# Patient Record
Sex: MALE | Race: WHITE | NOT HISPANIC OR LATINO | Employment: UNEMPLOYED | URBAN - METROPOLITAN AREA
[De-identification: names, ages, dates, MRNs, and addresses within clinical notes are randomized per-mention and may not be internally consistent; named-entity substitution may affect disease eponyms.]

---

## 2022-05-04 ENCOUNTER — HOSPITAL ENCOUNTER (EMERGENCY)
Facility: HOSPITAL | Age: 32
Discharge: HOME | End: 2022-05-04
Attending: EMERGENCY MEDICINE

## 2022-05-04 VITALS
DIASTOLIC BLOOD PRESSURE: 61 MMHG | HEART RATE: 48 BPM | RESPIRATION RATE: 16 BRPM | WEIGHT: 150.13 LBS | OXYGEN SATURATION: 100 % | TEMPERATURE: 96.1 F | SYSTOLIC BLOOD PRESSURE: 113 MMHG

## 2022-05-04 DIAGNOSIS — T40.1X1A HEROIN OVERDOSE (HCC): Primary | ICD-10-CM

## 2022-05-04 PROCEDURE — 99282 EMERGENCY DEPT VISIT SF MDM: CPT | Performed by: EMERGENCY MEDICINE

## 2022-05-04 PROCEDURE — 99284 EMERGENCY DEPT VISIT MOD MDM: CPT

## 2022-05-04 NOTE — Clinical Note
Rachel Johnathanhusam was seen and treated in our emergency department on 5/4/2022  Diagnosis:     Christian Brandee    He may return on this date: 05/07/2022         If you have any questions or concerns, please don't hesitate to call        Pamela Allen DO    ______________________________           _______________          _______________  Hospital Representative                              Date                                Time
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
## 2022-05-04 NOTE — ED NOTES
Walking in room with steady gait; given phone to call, and sandwich       Veronica Victoria RN  05/04/22 8781

## 2022-08-27 ENCOUNTER — APPOINTMENT (EMERGENCY)
Dept: CT IMAGING | Facility: HOSPITAL | Age: 32
End: 2022-08-27
Payer: COMMERCIAL

## 2022-08-27 ENCOUNTER — APPOINTMENT (OUTPATIENT)
Dept: RADIOLOGY | Facility: HOSPITAL | Age: 32
End: 2022-08-27
Payer: COMMERCIAL

## 2022-08-27 ENCOUNTER — HOSPITAL ENCOUNTER (EMERGENCY)
Facility: HOSPITAL | Age: 32
Discharge: HOME | End: 2022-08-28
Attending: EMERGENCY MEDICINE
Payer: COMMERCIAL

## 2022-08-27 VITALS
RESPIRATION RATE: 20 BRPM | OXYGEN SATURATION: 99 % | TEMPERATURE: 98.4 F | SYSTOLIC BLOOD PRESSURE: 137 MMHG | WEIGHT: 150.79 LBS | DIASTOLIC BLOOD PRESSURE: 72 MMHG | HEART RATE: 83 BPM

## 2022-08-27 DIAGNOSIS — S01.01XA SCALP LACERATION, INITIAL ENCOUNTER: ICD-10-CM
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
## 2022-08-27 DIAGNOSIS — S05.00XA CORNEAL ABRASION: ICD-10-CM

## 2022-08-27 PROCEDURE — 71045 X-RAY EXAM CHEST 1 VIEW: CPT

## 2022-08-27 PROCEDURE — G1004 CDSM NDSC: HCPCS

## 2022-08-27 PROCEDURE — 70450 CT HEAD/BRAIN W/O DYE: CPT

## 2022-08-27 PROCEDURE — 99284 EMERGENCY DEPT VISIT MOD MDM: CPT

## 2022-08-27 RX ORDER — LIDOCAINE HYDROCHLORIDE AND EPINEPHRINE 10; 10 MG/ML; UG/ML
5 INJECTION, SOLUTION INFILTRATION; PERINEURAL ONCE
Status: COMPLETED | OUTPATIENT
Start: 2022-08-27 | End: 2022-08-27

## 2022-08-27 RX ORDER — ERYTHROMYCIN 5 MG/G
OINTMENT OPHTHALMIC
Qty: 3.5 G | Refills: 0 | Status: SHIPPED | OUTPATIENT
Start: 2022-08-27

## 2022-08-27 RX ORDER — GINSENG 100 MG
1 CAPSULE ORAL ONCE
Status: COMPLETED | OUTPATIENT
Start: 2022-08-27 | End: 2022-08-27

## 2022-08-27 RX ORDER — TETRACAINE HYDROCHLORIDE 5 MG/ML
1 SOLUTION OPHTHALMIC ONCE
Status: COMPLETED | OUTPATIENT
Start: 2022-08-27 | End: 2022-08-27

## 2022-08-27 RX ORDER — ERYTHROMYCIN 5 MG/G
0.5 OINTMENT OPHTHALMIC ONCE
Status: COMPLETED | OUTPATIENT
Start: 2022-08-27 | End: 2022-08-27

## 2022-08-27 RX ADMIN — BACITRACIN ZINC 1 SMALL APPLICATION: 500 OINTMENT TOPICAL at 21:16

## 2022-08-27 RX ADMIN — ERYTHROMYCIN 0.5 INCH: 5 OINTMENT OPHTHALMIC at 21:17

## 2022-08-27 RX ADMIN — FLUORESCEIN SODIUM 1 STRIP: 1 STRIP OPHTHALMIC at 20:06

## 2022-08-27 RX ADMIN — LIDOCAINE HYDROCHLORIDE AND EPINEPHRINE 5 ML: 10; 10 INJECTION, SOLUTION INFILTRATION; PERINEURAL at 22:55

## 2022-08-27 RX ADMIN — TETRACAINE HYDROCHLORIDE 1 DROP: 5 SOLUTION OPHTHALMIC at 19:16

## 2022-08-27 RX ADMIN — FLUORESCEIN SODIUM 1 STRIP: 1 STRIP OPHTHALMIC at 21:45

## 2022-08-28 PROCEDURE — 12002 RPR S/N/AX/GEN/TRNK2.6-7.5CM: CPT | Performed by: EMERGENCY MEDICINE

## 2022-08-28 PROCEDURE — 99284 EMERGENCY DEPT VISIT MOD MDM: CPT | Performed by: EMERGENCY MEDICINE
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
## 2024-03-09 ENCOUNTER — APPOINTMENT (EMERGENCY)
Dept: RADIOLOGY | Facility: HOSPITAL | Age: 34
End: 2024-03-09

## 2024-03-09 ENCOUNTER — HOSPITAL ENCOUNTER (EMERGENCY)
Facility: HOSPITAL | Age: 34
Discharge: HOME | End: 2024-03-09
Attending: EMERGENCY MEDICINE

## 2024-03-09 VITALS
SYSTOLIC BLOOD PRESSURE: 162 MMHG | HEART RATE: 62 BPM | TEMPERATURE: 97.5 F | DIASTOLIC BLOOD PRESSURE: 78 MMHG | OXYGEN SATURATION: 100 % | RESPIRATION RATE: 20 BRPM

## 2024-03-09 DIAGNOSIS — F19.10 SUBSTANCE ABUSE (HCC): Primary | ICD-10-CM

## 2024-03-09 DIAGNOSIS — G92.9 TOXIC ENCEPHALOPATHY: ICD-10-CM

## 2024-03-09 LAB
ALBUMIN SERPL BCP-MCNC: 4.5 G/DL (ref 3.5–5)
ALP SERPL-CCNC: 55 U/L (ref 34–104)
ALT SERPL W P-5'-P-CCNC: 16 U/L (ref 7–52)
ANION GAP SERPL CALCULATED.3IONS-SCNC: 12 MMOL/L
APAP SERPL-MCNC: <2 UG/ML (ref 10–20)
AST SERPL W P-5'-P-CCNC: 24 U/L (ref 13–39)
ATRIAL RATE: 71 BPM
BASE EX.OXY STD BLDV CALC-SCNC: 95.1 % (ref 60–80)
BASE EXCESS BLDV CALC-SCNC: -2.8 MMOL/L
BASOPHILS # BLD AUTO: 0.04 THOUSANDS/ÂΜL (ref 0–0.1)
BASOPHILS NFR BLD AUTO: 0 % (ref 0–1)
BILIRUB SERPL-MCNC: 0.68 MG/DL (ref 0.2–1)
BUN SERPL-MCNC: 16 MG/DL (ref 5–25)
CALCIUM SERPL-MCNC: 9.5 MG/DL (ref 8.4–10.2)
CHLORIDE SERPL-SCNC: 104 MMOL/L (ref 96–108)
CK SERPL-CCNC: 567 U/L (ref 39–308)
CO2 SERPL-SCNC: 24 MMOL/L (ref 21–32)
CREAT SERPL-MCNC: 1.15 MG/DL (ref 0.6–1.3)
EOSINOPHIL # BLD AUTO: 0.03 THOUSAND/ÂΜL (ref 0–0.61)
EOSINOPHIL NFR BLD AUTO: 0 % (ref 0–6)
ERYTHROCYTE [DISTWIDTH] IN BLOOD BY AUTOMATED COUNT: 13.7 % (ref 11.6–15.1)
ETHANOL SERPL-MCNC: <10 MG/DL
GFR SERPL CREATININE-BSD FRML MDRD: 81 ML/MIN/1.73SQ M
GLUCOSE SERPL-MCNC: 83 MG/DL (ref 65–140)
HCO3 BLDV-SCNC: 22.5 MMOL/L (ref 24–30)
HCT VFR BLD AUTO: 33.2 % (ref 36.5–49.3)
HGB BLD-MCNC: 12.2 G/DL (ref 12–17)
IMM GRANULOCYTES # BLD AUTO: 0.05 THOUSAND/UL (ref 0–0.2)
IMM GRANULOCYTES NFR BLD AUTO: 0 % (ref 0–2)
LYMPHOCYTES # BLD AUTO: 1.31 THOUSANDS/ÂΜL (ref 0.6–4.47)
LYMPHOCYTES NFR BLD AUTO: 12 % (ref 14–44)
MCH RBC QN AUTO: 28.7 PG (ref 26.8–34.3)
MCHC RBC AUTO-ENTMCNC: 36.7 G/DL (ref 31.4–37.4)
MCV RBC AUTO: 78 FL (ref 82–98)
MONOCYTES # BLD AUTO: 0.7 THOUSAND/ÂΜL (ref 0.17–1.22)
MONOCYTES NFR BLD AUTO: 6 % (ref 4–12)
NEUTROPHILS # BLD AUTO: 9.12 THOUSANDS/ÂΜL (ref 1.85–7.62)
NEUTS SEG NFR BLD AUTO: 82 % (ref 43–75)
NRBC BLD AUTO-RTO: 0 /100 WBCS
O2 CT BLDV-SCNC: 17.2 ML/DL
P AXIS: 78 DEGREES
PCO2 BLDV: 41.2 MM HG (ref 42–50)
PH BLDV: 7.36 [PH] (ref 7.3–7.4)
PLATELET # BLD AUTO: 293 THOUSANDS/UL (ref 149–390)
PMV BLD AUTO: 11.5 FL (ref 8.9–12.7)
PO2 BLDV: 144.1 MM HG (ref 35–45)
POTASSIUM SERPL-SCNC: 3.9 MMOL/L (ref 3.5–5.3)
PR INTERVAL: 154 MS
PROT SERPL-MCNC: 6.8 G/DL (ref 6.4–8.4)
QRS AXIS: 50 DEGREES
QRSD INTERVAL: 96 MS
QT INTERVAL: 498 MS
QTC INTERVAL: 541 MS
RBC # BLD AUTO: 4.25 MILLION/UL (ref 3.88–5.62)
SALICYLATES SERPL-MCNC: <5 MG/DL (ref 3–20)
SODIUM SERPL-SCNC: 140 MMOL/L (ref 135–147)
T WAVE AXIS: 68 DEGREES
VENTRICULAR RATE: 71 BPM
WBC # BLD AUTO: 11.25 THOUSAND/UL (ref 4.31–10.16)

## 2024-03-09 PROCEDURE — 82077 ASSAY SPEC XCP UR&BREATH IA: CPT

## 2024-03-09 PROCEDURE — 96361 HYDRATE IV INFUSION ADD-ON: CPT

## 2024-03-09 PROCEDURE — 70450 CT HEAD/BRAIN W/O DYE: CPT

## 2024-03-09 PROCEDURE — 96376 TX/PRO/DX INJ SAME DRUG ADON: CPT

## 2024-03-09 PROCEDURE — 99284 EMERGENCY DEPT VISIT MOD MDM: CPT

## 2024-03-09 PROCEDURE — 71045 X-RAY EXAM CHEST 1 VIEW: CPT

## 2024-03-09 PROCEDURE — 80179 DRUG ASSAY SALICYLATE: CPT

## 2024-03-09 PROCEDURE — 82805 BLOOD GASES W/O2 SATURATION: CPT

## 2024-03-09 PROCEDURE — 96374 THER/PROPH/DIAG INJ IV PUSH: CPT

## 2024-03-09 PROCEDURE — 80143 DRUG ASSAY ACETAMINOPHEN: CPT

## 2024-03-09 PROCEDURE — 85025 COMPLETE CBC W/AUTO DIFF WBC: CPT

## 2024-03-09 PROCEDURE — 93005 ELECTROCARDIOGRAM TRACING: CPT

## 2024-03-09 PROCEDURE — 99285 EMERGENCY DEPT VISIT HI MDM: CPT | Performed by: EMERGENCY MEDICINE

## 2024-03-09 PROCEDURE — 93010 ELECTROCARDIOGRAM REPORT: CPT | Performed by: INTERNAL MEDICINE

## 2024-03-09 PROCEDURE — 82550 ASSAY OF CK (CPK): CPT

## 2024-03-09 PROCEDURE — 80053 COMPREHEN METABOLIC PANEL: CPT

## 2024-03-09 PROCEDURE — 36415 COLL VENOUS BLD VENIPUNCTURE: CPT

## 2024-03-09 RX ORDER — MIDAZOLAM HYDROCHLORIDE 2 MG/2ML
INJECTION, SOLUTION INTRAMUSCULAR; INTRAVENOUS
Status: COMPLETED
Start: 2024-03-09 | End: 2024-03-09

## 2024-03-09 RX ORDER — MIDAZOLAM HYDROCHLORIDE 2 MG/2ML
4 INJECTION, SOLUTION INTRAMUSCULAR; INTRAVENOUS ONCE
Status: COMPLETED | OUTPATIENT
Start: 2024-03-09 | End: 2024-03-09

## 2024-03-09 RX ORDER — MIDAZOLAM HYDROCHLORIDE 2 MG/2ML
2 INJECTION, SOLUTION INTRAMUSCULAR; INTRAVENOUS ONCE
Status: COMPLETED | OUTPATIENT
Start: 2024-03-09 | End: 2024-03-09

## 2024-03-09 RX ORDER — KETAMINE HYDROCHLORIDE 100 MG/ML
1 INJECTION, SOLUTION INTRAMUSCULAR; INTRAVENOUS ONCE
Status: COMPLETED | OUTPATIENT
Start: 2024-03-09 | End: 2024-03-09

## 2024-03-09 RX ADMIN — MIDAZOLAM HYDROCHLORIDE 2 MG: 2 INJECTION, SOLUTION INTRAMUSCULAR; INTRAVENOUS at 15:00

## 2024-03-09 RX ADMIN — MIDAZOLAM 2 MG: 1 INJECTION INTRAMUSCULAR; INTRAVENOUS at 15:00

## 2024-03-09 RX ADMIN — MIDAZOLAM 4 MG: 1 INJECTION INTRAMUSCULAR; INTRAVENOUS at 11:25

## 2024-03-09 RX ADMIN — SODIUM CHLORIDE 1000 ML: 0.9 INJECTION, SOLUTION INTRAVENOUS at 14:06

## 2024-03-09 NOTE — ED PROVIDER NOTES
History  Chief Complaint   Patient presents with    Drug Problem     Patient coming in via EMS in restraints. Police called to his apartment for public disturbance. PD found patient in drug induced psychosis. Patient given 300 ketamine by EMS     Patient is a 33-year-old male with past medical history of drug abuse, cocaine and crack cocaine abuse, who presents emergency department with after being sedated in the field for altered mental status and combativeness.  Per police report, they have been called to this patient's department numerous times in the past, reports that he has previously snorted cocaine, said that there was a spoon as drug paraphernalia at the apartment today, reports that when they arrived he had spilled a bottle of olive oil and was rolling around in it, they were called because of neighbors reporting agitated behavior, he required multiple police officers to restrain him, was given ketamine on the field by EMS, 320 mg, blood sugar and vital signs were appropriate in the field after administration of ketamine.  Prior to that, patient was running around, banging into things, speaking in non-English/not Vietnamese language/gibberish.  He is still occasionally agitated when physically stimulated but otherwise is calm/sedated.    Per the family, no report of new high risk behaviors, no recent traumatic events, has not described any drug use aside from cocaine and crack, last the family spoke with him he was otherwise well.  The police report that the mother did file a warrant for the free, currently the patient is incarcerated, here for medical clearance prior to incarceration.        Prior to Admission Medications   Prescriptions Last Dose Informant Patient Reported? Taking?   erythromycin (ILOTYCIN) ophthalmic ointment   No No   Sig: Place a 1/2 inch ribbon of ointment into the lower eyelid four times per day for 5 days.  .      Facility-Administered Medications: None       No past medical history  on file.    No past surgical history on file.    No family history on file.  I have reviewed and agree with the history as documented.    E-Cigarette/Vaping    E-Cigarette Use Never User      E-Cigarette/Vaping Substances     Social History     Tobacco Use    Smoking status: Some Days    Smokeless tobacco: Never   Vaping Use    Vaping status: Never Used   Substance Use Topics    Alcohol use: Yes     Comment: some days    Drug use: Never     Comment: denies use        Review of Systems    Physical Exam  ED Triage Vitals   Temperature Pulse Respirations Blood Pressure SpO2   03/09/24 1037 03/09/24 1020 03/09/24 1020 03/09/24 1020 03/09/24 1020   97.5 °F (36.4 °C) 91 18 127/65 100 %      Temp Source Heart Rate Source Patient Position - Orthostatic VS BP Location FiO2 (%)   03/09/24 1037 03/09/24 1020 03/09/24 1020 03/09/24 1020 --   Oral Monitor Lying Right arm       Pain Score       --                    Orthostatic Vital Signs  Vitals:    03/09/24 1400 03/09/24 1430 03/09/24 1600 03/09/24 1645   BP: 112/70 124/82 136/87 162/78   Pulse: (!) 50 (!) 54 (!) 48 62   Patient Position - Orthostatic VS: Lying Lying Lying        Physical Exam  Vitals and nursing note reviewed.   Constitutional:       Appearance: He is well-developed.      Comments: Patient is sedated, otherwise, when physically prompted/stimulated, he will roll around in the bed, does not speak in clear sentences, makes nonsensical noises, he is moving all his extremities independently and with normal range of motion   HENT:      Head: Normocephalic and atraumatic.   Eyes:      Conjunctiva/sclera: Conjunctivae normal.      Pupils: Pupils are equal, round, and reactive to light.      Comments: Pupils are equal round, reactive to light   Cardiovascular:      Rate and Rhythm: Normal rate and regular rhythm.      Heart sounds: No murmur heard.  Pulmonary:      Effort: Pulmonary effort is normal. No respiratory distress.      Breath sounds: Normal breath sounds.  No wheezing, rhonchi or rales.   Abdominal:      Palpations: Abdomen is soft.      Tenderness: There is no abdominal tenderness. There is no guarding or rebound.   Musculoskeletal:         General: Signs of injury present. No swelling or tenderness.      Cervical back: Neck supple.      Comments: There is a minor abrasion to the right anterior fibula, it is a open skin tear, superficial, there is no exposed bone or tendon, the bleeding is well-controlled   Skin:     General: Skin is warm and dry.      Capillary Refill: Capillary refill takes less than 2 seconds.      Findings: No bruising, erythema or rash.   Neurological:      Mental Status: He is alert.      Comments: Patient is not hyperreflexic, he is sedated, currently not alert and oriented to person place or time, not responsive to verbal stimuli   Psychiatric:         Mood and Affect: Mood normal.         ED Medications  Medications   ketamine (FOR EMS ONLY) (KETALAR) 100 mg/mL 500 mg (0 mg Does not apply Given to EMS 3/9/24 1024)   midazolam (VERSED) injection 4 mg (4 mg Intravenous Given 3/9/24 1125)   sodium chloride 0.9 % bolus 1,000 mL (0 mL Intravenous Stopped 3/9/24 1606)   midazolam (VERSED) injection 2 mg (2 mg Intravenous Given 3/9/24 1500)       Diagnostic Studies  Results Reviewed       Procedure Component Value Units Date/Time    Salicylate level [[id number]]  (Normal) Collected: 03/09/24 1030    Lab Status: Final result Specimen: Blood from Arm, Right Updated: 03/09/24 1436     Salicylate Lvl <5 mg/dL     Comprehensive metabolic panel [[id number]] Collected: 03/09/24 1030    Lab Status: Final result Specimen: Blood from Arm, Right Updated: 03/09/24 1302     Sodium 140 mmol/L      Potassium 3.9 mmol/L      Chloride 104 mmol/L      CO2 24 mmol/L      ANION GAP 12 mmol/L      BUN 16 mg/dL      Creatinine 1.15 mg/dL      Glucose 83 mg/dL      Calcium 9.5 mg/dL      AST 24 U/L      ALT 16 U/L      Alkaline Phosphatase 55 U/L      Total Protein 6.8  g/dL      Albumin 4.5 g/dL      Total Bilirubin 0.68 mg/dL      eGFR 81 ml/min/1.73sq m     Narrative:      National Kid[employer] Disease Foundation guidelines for Chronic Kidney Disease (CKD):     Stage 1 with normal or high GFR (GFR > 90 mL/min/1.73 square meters)    Stage 2 Mild CKD (GFR = 60-89 mL/min/1.73 square meters)    Stage 3A Moderate CKD (GFR = 45-59 mL/min/1.73 square meters)    Stage 3B Moderate CKD (GFR = 30-44 mL/min/1.73 square meters)    Stage 4 Severe CKD (GFR = 15-29 mL/min/1.73 square meters)    Stage 5 End Stage CKD (GFR <15 mL/min/1.73 square meters)  Note: GFR calculation is accurate only with a steady state creatinine    CK [[id number]]  (Abnormal) Collected: 03/09/24 1030    Lab Status: Final result Specimen: Blood from Arm, Right Updated: 03/09/24 1302     Total  U/L     Acetaminophen level-If concentration is detectable, please discuss with medical  on call. [[id number]]  (Abnormal) Collected: 03/09/24 1030    Lab Status: Final result Specimen: Blood from Arm, Right Updated: 03/09/24 1302     Acetaminophen Level <2 ug/mL     Ethanol [[id number]]  (Normal) Collected: 03/09/24 1030    Lab Status: Final result Specimen: Blood from Arm, Right Updated: 03/09/24 1126     Ethanol Lvl <10 mg/dL     CBC and differential [[id number]]  (Abnormal) Collected: 03/09/24 1030    Lab Status: Final result Specimen: Blood from Arm, Right Updated: 03/09/24 1103     WBC 11.25 Thousand/uL      RBC 4.25 Million/uL      Hemoglobin 12.2 g/dL      Hematocrit 33.2 %      MCV 78 fL      MCH 28.7 pg      MCHC 36.7 g/dL      RDW 13.7 %      MPV 11.5 fL      Platelets 293 Thousands/uL      nRBC 0 /100 WBCs      Neutrophils Relative 82 %      Immature Grans % 0 %      Lymphocytes Relative 12 %      Monocytes Relative 6 %      Eosinophils Relative 0 %      Basophils Relative 0 %      Neutrophils Absolute 9.12 Thousands/µL      Absolute Immature Grans 0.05 Thousand/uL      Absolute Lymphocytes 1.31 Thousands/µL       Absolute Monocytes 0.70 Thousand/µL      Eosinophils Absolute 0.03 Thousand/µL      Basophils Absolute 0.04 Thousands/µL     Blood gas, venous [[id number]]  (Abnormal) Collected: 03/09/24 1030    Lab Status: Final result Specimen: Blood from Arm, Right Updated: 03/09/24 1102     pH, Dinesh 7.356     pCO2, Dinesh 41.2 mm Hg      pO2, Dinesh 144.1 mm Hg      HCO3, Dinesh 22.5 mmol/L      Base Excess, Dinesh -2.8 mmol/L      O2 Content, Dinesh 17.2 ml/dL      O2 HGB, VENOUS 95.1 %                    XR chest 1 view portable   Final Result by Ray Gutiérrez MD (03/09 1250)      No acute cardiopulmonary disease.            Workstation performed: [id number]         CT head without contrast   Final Result by Gab Green DO (03/09 1114)      No acute intracranial abnormality.                  Workstation performed: [id number]               Procedures  Procedures      ED Course  ED Course as of 03/16/24 0002   Sat Mar 09, 2024   1130 Patient is more verbally and physically agitated, will opt for 4mg Versed   1131 CBC demonstrates mild elevation white count, likely stress reaction, coma panel returns with ethanol less than 10, patient's VBG demonstrates mild decrease in pH, base excess of negative HCO3 22.5   1131 CT head read as no acute intracranial abnormalities   1131 EKG demonstrates normal sinus rhythm, no evidence of ACS features concerning for   1132 Chest x-ray demonstrates no evidence of pneumonia or pneumothorax   1353 Acetaminophen level not elevated, total  -will give a liter bolus of saline                                       Medical Decision Making  Patient is a 33 y.o. male with PMH of cocaine abuse who presents to the ED with intoxication, altered mental status.    Vital signs on arrival within normal limits. On exam patient is seen in the bed, he is sedated, he is not alert and oriented to person place or time, received ketamine in the field, 320 mics, respiratory effort is within normal limits, saturation is  normal, chest rise and fall is appropriate, abdomen is soft and nontender, pupils are equal round reactive to light, no evidence of head trauma, no evidence of skeletal trauma, cardiopulmonary auscultation within normal limits, no evidence of rash or infection, there is an abrasion to the right shin that is superficial with some minor oozing at this time.    History and physical exam most consistent with ketamine sedation secondary to drug-induced psychosis. However, differential diagnosis included but not limited to arrhythmia, electrolyte abnormality, intracranial abnormality including but not limited to skull fracture or intracranial hemorrhage, pneumothorax, alcohol intoxication, drug overdose, rhabdomyolysis, infectious process. Plan coma panel, CK, CBC, CMP, VBG, ECG, chest x-ray, CT head Noncon, IV sedation as needed for drug-induced psychosis.    View ED course above for further discussion on patient workup.     Ketamine wearing off, patient making verbal disturbances, moaning out loud, he is still not alert and oriented to person place and time, not able to answer basic questions, still in the midst of drug-induced psychosis, we will administer Versed IV, 4 mg.    Chest x-ray demonstrates no evidence of acute cardiopulmonary disease, CT head Noncon demonstrates no acute intracranial abnormality    He demonstrates no evidence of arrhythmia or features concerning for ACS, laboratory results demonstrating VBG with pH of 7.356, bicarb 22.5, base excess -2.8, CBC demonstrates mildly elevated white blood cell count 1.25, consistent with stress reaction, CMP within normal limits, CK demonstrates minor elevation, panel returning with no elevated levels.     Patient's presentation remains consistent with police report of cocaine and crack cocaine abuse.    1 L bolus of normal saline ordered.    All labs reviewed and utilized in the medical decision making process  All radiology studies independently viewed by me and  interpreted by the radiologist.  I reviewed all testing with the patient.     Upon re-evaluation patient is awakening, conversational, he is still disoriented, he is able to offer his name, he is unaware of his current situation, he is able to verbalize that he had 2 hits of cocaine today.    Patient continuing to remain verbally and physically agitated, and additional 2 mg of IV Versed ordered.    After second dose of Versed, patient is calm, collected, slowly waking, seen for food, during reassessment he is conversational in room, alert, oriented, tolerating p.o. intake, he has remained hemodynamically stable, no new issue/complaint, no pain complaint negative review of systems at this time.     Patient cleared for incarceration.    Plan for care discussed with pt, acknowledges plan for care, consents to plan for care, educated on symptoms concerning for return to the emergency department, feels safe to return home at this time, cleared for discharge.        Amount and/or Complexity of Data Reviewed  Labs: ordered.  Radiology: ordered.    Risk  Prescription drug management.          Disposition  Final diagnoses:   Substance abuse (HCC)   Toxic encephalopathy     Time reflects when diagnosis was documented in both MDM as applicable and the Disposition within this note       Time User Action Codes Description Comment    3/9/2024  5:07 PM Arnoldo Maloney Add [F19.10] Substance abuse (HCC)     3/9/2024  5:07 PM Arnoldo Maloney Add [G92.9] Toxic encephalopathy           ED Disposition       ED Disposition   Discharge    Condition   Stable    Date/Time   Sat Mar 9, 2024  5:07 PM    Comment   Keith Rodolfo discharge to home/self care.                   Follow-up Information       Follow up With Specialties Details Why Contact Info Additional Information    [facility] Emergency Department Emergency Medicine Go to  If symptoms worsen [address] [facility] [address] [phone] [facility]  [facility] Emergency Department, [address] [place] [address], [address]   [phone]            Discharge Medication List as of 3/9/2024  5:08 PM        CONTINUE these medications which have NOT CHANGED    Details   erythromycin (ILOTYCIN) ophthalmic ointment Place a 1/2 inch ribbon of ointment into the lower eyelid four times per day for 5 days.  ., Print           No discharge procedures on file.    PDMP Review       None             ED Provider  Attending physically available and evaluated Keith Reynolds. I managed the patient along with the ED Attending.    Electronically Signed by           Arnoldo Maloney DO  03/16/24 0002

## 2024-03-09 NOTE — ED ATTENDING ATTESTATION
3/9/2024  I, Tess Brothers MD, saw and evaluated the patient. I have discussed the patient with the resident/non-physician practitioner and agree with the resident's/non-physician practitioner's findings, Plan of Care, and MDM as documented in the resident's/non-physician practitioner's note, except where noted. All available labs and Radiology studies were reviewed.  I was present for key portions of any procedure(s) performed by the resident/non-physician practitioner and I was immediately available to provide assistance.       At this point I agree with the current assessment done in the Emergency Department.  I have conducted an independent evaluation of this patient a history and physical is as follows:    33-year-old male with past medical history of substance abuse who presents for evaluation of altered mental status.  Patient was brought in by EMS.  Patient was apparently wandering around his apartment agitated, hitting his head and trying to bite people.  Patient has history of crack cocaine use.  Patient was given ketamine prehospital.     Physical exam:  Vital signs reviewed, within normal limits.  Head normocephalic, atraumatic, mucous membranes moist, pupils dilated bilaterally, neck supple, heart regular rate and rhythm, lungs clear to auscultation bilaterally, abdomen soft, non-tender, non distended, no focal deficits, no clonus. Skin tear to the right shin.    Assessment/plan:  33-year-old male with past medical history of substance abuse who presents for evaluation of altered mental status.   Likely drug-induced psychosis.  Will obtain CBC to evaluate for leukocytosis or anemia, CMP to evaluate for metabolic abnormality, will panel to evaluate for coingestions, CK to evaluate for rhabdo, VBG to evaluate for acid-base disturbance.  Will obtain EKG to evaluate for arrhythmia. Will obtain ct head to evaluate for intra cranial bleed. Will treat with fluids and benzos and reassess.      ED Course      Reviewed labs, no marked abnormalities.  CK mildly elevated but not consistent with rhabdo.  Will give fluids.  CT head negative.  Reassessed patient, he is awake and alert, he is able to tolerate p.o.  He is able to converse with me, patient cleared to go with police custody.    Critical Care Time  Procedures

## 2024-03-09 NOTE — ED NOTES
Pt taken out of 4 point restraints. Remains in police custody.      Kae Sheffield, RN  03/09/24 4907

## 2024-03-09 NOTE — ED NOTES
pt awake and alert at this time. Does not remember what happened prior to arrival today. Pt given water and snack. RL and LA restraint taken off at this time.      Kae Sheffield RN  03/09/24 7890

## 2024-03-09 NOTE — ED NOTES
2mg versed given by JEFFREY Knapp, due to pt being increasingly agitated, screaming, and turning self erratically in bed. LA and R leg placed back on pt at this time.      Kae Sheffield RN  03/09/24 9139